# Patient Record
(demographics unavailable — no encounter records)

---

## 2024-11-14 NOTE — PHYSICAL EXAM
[No Acute Distress] : no acute distress [Normal Oropharynx] : normal oropharynx [Normal Appearance] : normal appearance [No Neck Mass] : no neck mass [Normal Rate/Rhythm] : normal rate/rhythm [Normal S1, S2] : normal s1, s2 [No Resp Distress] : no resp distress [Clear to Auscultation Bilaterally] : clear to auscultation bilaterally [No Clubbing] : no clubbing [No Cyanosis] : no cyanosis [No Edema] : no edema [Oriented x3] : oriented x3 [Normal Affect] : normal affect

## 2024-11-14 NOTE — REVIEW OF SYSTEMS
[Fever] : no fever [Chills] : no chills [Cough] : cough [Sputum] : no sputum [Dyspnea] : dyspnea [Chest Discomfort] : no chest discomfort [Orthopnea] : no orthopnea [Hay Fever] : no hay fever [GERD] : no gerd [Diarrhea] : no diarrhea [Raynaud] : no raynaud [Rash] : no rash [Anemia] : no anemia

## 2024-11-14 NOTE — HISTORY OF PRESENT ILLNESS
[TextBox_4] : Ms. Fairchild is a 29 year old woman who presents today for ongoing shortness of breath.  Her cardiologist referred her here as she had been having some recent cough (mostly at night) and is seeing them for atypical chest pains and reports that her heart function has been ruled to be normal. The cough was noted to only really happen at night about 2-3 times per week and was worse with her second pregnancy. 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 She was also diagnosed with asthma as a child but hadn't had any flare ups since teenage years.  After that appointment, we planned to treat her with nightly Symbicort and to be taken as needed during the day as well. Today she presents in follow-up with PFTs, and her cough is better overall. No notable breathing issues at the moment.  PFTs 11/14/2024: FEV1 3.33/114%, FVC 3.03/118%, ratio 83, TLC 5.12/103%, RV 1.09/97%, DLCO 24.39.